# Patient Record
Sex: FEMALE | Race: WHITE | NOT HISPANIC OR LATINO | Employment: FULL TIME | URBAN - METROPOLITAN AREA
[De-identification: names, ages, dates, MRNs, and addresses within clinical notes are randomized per-mention and may not be internally consistent; named-entity substitution may affect disease eponyms.]

---

## 2023-01-25 ENCOUNTER — OFFICE VISIT (OUTPATIENT)
Dept: URGENT CARE | Facility: CLINIC | Age: 18
End: 2023-01-25

## 2023-01-25 VITALS
TEMPERATURE: 98.9 F | WEIGHT: 95.6 LBS | BODY MASS INDEX: 18.05 KG/M2 | DIASTOLIC BLOOD PRESSURE: 68 MMHG | HEART RATE: 76 BPM | OXYGEN SATURATION: 100 % | HEIGHT: 61 IN | SYSTOLIC BLOOD PRESSURE: 104 MMHG | RESPIRATION RATE: 18 BRPM

## 2023-01-25 DIAGNOSIS — N60.12 FIBROCYSTIC CHANGES OF LEFT BREAST: Primary | ICD-10-CM

## 2023-01-25 NOTE — PATIENT INSTRUCTIONS
Clinical presentation appears to be consistent with fibrocystic breast of the left breast  There are no signs of any other abnormalities present on exam at this time  It was discussed with the patient that this kind of breast mass/lump can fluctuate with the menstrual cycle and can be treated with applying a heating pad over the site, and taking Tylenol or Motrin as needed for discomfort  Patient has been instructed to follow up with her PCP office for re-check in 1 week  If symptoms acutely worsen or any new symptoms present, patient is to be seen in the ER

## 2023-01-25 NOTE — PROGRESS NOTES
[facility]        NAME: Darrel Sarabia is a 21 y o  female  : 2005    MRN: [id number]  DATE: 2023  TIME: 3:00 PM    Assessment and Plan   Fibrocystic changes of left breast [N60 12]  1  Fibrocystic changes of left breast              Patient Instructions     Patient Instructions   Clinical presentation appears to be consistent with fibrocystic breast of the left breast  There are no signs of any other abnormalities present on exam at this time  It was discussed with the patient that this kind of breast mass/lump can fluctuate with the menstrual cycle and can be treated with applying a heating pad over the site, and taking Tylenol or Motrin as needed for discomfort  Patient has been instructed to follow up with her PCP office for re-check in 1 week  If symptoms acutely worsen or any new symptoms present, patient is to be seen in the ER  Follow up with PCP in 3-5 days  Proceed to  ER if symptoms worsen  Chief Complaint     Chief Complaint   Patient presents with   • Breast Mass     Pt here for concerns of lump in left breast x 2 months  History of Present Illness       22 yo female presents for a L breast lump for the past 2 months  She denies any injury to the site  No bruising or redness of the overlying skin  She states the mass does seem to fluctuate in size and feels mobile  She does note the area feels more swollen and tender around the time of her menstrual cycle  No fever/chills  No nipple changes or discharge  No swollen/painful lymph nodes in the axillary region  No right breast symptoms  No chest pain or SOB  No abdominal pain or GI symptoms  No skin rashes  She has not attempted any treatment for the symptoms  She has not been seen for this prior to today's visit  She feels well otherwise with no other complaints  She is otherwise healthy with no known medical problems  Review of Systems   Review of Systems   Constitutional: Negative      Respiratory: Negative  Cardiovascular: Negative  Gastrointestinal: Negative  Genitourinary: Negative  Musculoskeletal: Negative  Skin:        As noted in HPI   Allergic/Immunologic:        As noted in chart   Neurological: Negative  Current Medications     No current outpatient medications on file  Current Allergies     Allergies as of 01/25/2023 - Reviewed 01/25/2023   Allergen Reaction Noted   • Keflex [cephalexin] GI Intolerance 01/25/2023            The following portions of the patient's history were reviewed and updated as appropriate: allergies, current medications, past family history, past medical history, past social history, past surgical history and problem list      Past Medical History:   Diagnosis Date   • Patient denies medical problems        Past Surgical History:   Procedure Laterality Date   • NO PAST SURGERIES         Family History   Problem Relation Age of Onset   • No Known Problems Mother    • No Known Problems Father          Medications have been verified  Objective   /68   Pulse 76   Temp 98 9 °F (37 2 °C) (Tympanic)   Resp 18   Ht 5' 1" (1 549 m)   Wt 43 4 kg (95 lb 9 6 oz)   SpO2 100%   BMI 18 06 kg/m²   No LMP recorded  Physical Exam     Physical Exam  Vitals and nursing note reviewed  Constitutional:       General: She is awake  She is not in acute distress  Appearance: Normal appearance  She is well-developed and well-groomed  She is not ill-appearing, toxic-appearing or diaphoretic  Cardiovascular:      Rate and Rhythm: Normal rate and regular rhythm  Pulses: Normal pulses  Heart sounds: Normal heart sounds  Pulmonary:      Effort: Pulmonary effort is normal  No tachypnea, accessory muscle usage or respiratory distress  Breath sounds: Normal breath sounds and air entry  Chest:   Breasts:     Right: Normal       Left: Mass and tenderness present  No bleeding, inverted nipple, nipple discharge or skin change  Comments: There is a localized area of lumpiness and mild tenderness in the medial lower quadrant of the left breast  No erythema or bruising of the overlying skin  Skin is appropriately warm and intact  No fluctuant area consistent with an abscess  No nipple discharge  No lymphadenopathy noted  Lymphadenopathy:      Upper Body:      Right upper body: No supraclavicular, axillary or pectoral adenopathy  Left upper body: No supraclavicular, axillary or pectoral adenopathy  Skin:     General: Skin is warm and dry  Capillary Refill: Capillary refill takes less than 2 seconds  Coloration: Skin is not pale  Findings: No abrasion, abscess, bruising, ecchymosis, erythema, rash or wound  Neurological:      Mental Status: She is alert and oriented to person, place, and time  Mental status is at baseline  Psychiatric:         Mood and Affect: Mood normal          Behavior: Behavior normal  Behavior is cooperative  Thought Content:  Thought content normal          Judgment: Judgment normal

## 2023-01-28 ENCOUNTER — HOSPITAL ENCOUNTER (EMERGENCY)
Facility: HOSPITAL | Age: 18
Discharge: HOME | End: 2023-01-28
Attending: EMERGENCY MEDICINE

## 2023-01-28 VITALS
HEART RATE: 68 BPM | RESPIRATION RATE: 20 BRPM | OXYGEN SATURATION: 98 % | DIASTOLIC BLOOD PRESSURE: 62 MMHG | SYSTOLIC BLOOD PRESSURE: 121 MMHG | TEMPERATURE: 98.2 F

## 2023-01-28 DIAGNOSIS — F41.9 ANXIETY: Primary | ICD-10-CM

## 2023-01-28 NOTE — DISCHARGE INSTRUCTIONS
Follow up with resources provided to you today by behavioral health counselor    Return to ED for new or worsening symptoms

## 2023-01-28 NOTE — ED PROVIDER NOTES
History  Chief Complaint   Patient presents with   • Panic Attack     Pt presents to ED states she thinks shes having a panic attack  Had recent testing done at [facility] for a lump in breast, blood work and testing was normal but patient is now anxious  Was prescribed xanax by PCP, took one dose last night and slept for 12 hours but when she woke up she states her heart was racing, nausea, gagging when she tries to eat      Patient is a 20 yo wf who reports 3 day history of dry heave vomiting whenever she goes to eat  States she has had lump to L breast for past couple months  Seen by urgent care recently and told she has fibrocystic changes  Seen at [facility] twice in last couple days  Had US breast, blood work and told everything looked fine  Spoke to PCP Dr Glenda Cooper over the phone and rx'd xanax  Woke middle of this past night feeling anxious and "gagging", then felt her heart racing  Denies SI/HI  Smokes cigarettes  Smokes marijuana  None       Past Medical History:   Diagnosis Date   • Patient denies medical problems        Past Surgical History:   Procedure Laterality Date   • NO PAST SURGERIES         Family History   Problem Relation Age of Onset   • No Known Problems Mother    • No Known Problems Father      I have reviewed and agree with the history as documented  E-Cigarette/Vaping   • E-Cigarette Use Never User      E-Cigarette/Vaping Substances     Social History     Tobacco Use   • Smoking status: Never   • Smokeless tobacco: Never   Vaping Use   • Vaping Use: Never used   Substance Use Topics   • Alcohol use: Never   • Drug use: Yes     Types: Marijuana       Review of Systems   Constitutional: Negative for chills and fever  HENT: Negative for ear pain and sore throat  Eyes: Negative for pain  Respiratory: Negative for cough and shortness of breath  Cardiovascular: Negative for chest pain and palpitations     Gastrointestinal: Negative for abdominal pain, diarrhea, nausea and vomiting  Genitourinary: Negative for dysuria and hematuria  Musculoskeletal: Negative for arthralgias and back pain  Skin: Negative for color change and rash  Neurological: Negative for syncope  Psychiatric/Behavioral: Negative for suicidal ideas  The patient is nervous/anxious  All other systems reviewed and are negative  Physical Exam  Physical Exam  Vitals and nursing note reviewed  Constitutional:       General: She is not in acute distress  Appearance: Normal appearance  She is not ill-appearing, toxic-appearing or diaphoretic  Comments: Anxious affect   HENT:      Head: Normocephalic and atraumatic  Right Ear: Tympanic membrane, ear canal and external ear normal       Left Ear: Tympanic membrane, ear canal and external ear normal       Nose: Nose normal       Mouth/Throat:      Mouth: Mucous membranes are moist       Pharynx: Oropharynx is clear  Eyes:      Extraocular Movements: Extraocular movements intact  Conjunctiva/sclera: Conjunctivae normal       Pupils: Pupils are equal, round, and reactive to light  Cardiovascular:      Rate and Rhythm: Normal rate and regular rhythm  Pulses: Normal pulses  Heart sounds: Normal heart sounds  Pulmonary:      Effort: Pulmonary effort is normal       Breath sounds: Normal breath sounds  Abdominal:      General: Abdomen is flat  Bowel sounds are normal       Palpations: Abdomen is soft  Tenderness: There is no abdominal tenderness  There is no right CVA tenderness, left CVA tenderness, guarding or rebound  Hernia: No hernia is present  Musculoskeletal:         General: Normal range of motion  Cervical back: Normal range of motion and neck supple  Skin:     General: Skin is warm and dry  Capillary Refill: Capillary refill takes less than 2 seconds  Neurological:      General: No focal deficit present  Mental Status: She is alert and oriented to person, place, and time  Psychiatric:      Comments: Anxious affect  conversive          Vital Signs  ED Triage Vitals [01/28/23 1045]   Temperature Pulse Respirations Blood Pressure SpO2   98 2 °F (36 8 °C) 68 20 121/62 98 %      Temp Source Heart Rate Source Patient Position - Orthostatic VS BP Location FiO2 (%)   Oral Monitor Lying Right arm --      Pain Score       --           Vitals:    01/28/23 1045   BP: 121/62   Pulse: 68   Patient Position - Orthostatic VS: Lying         Visual Acuity      ED Medications  Medications - No data to display    Diagnostic Studies  Results Reviewed     None                 No orders to display              Procedures  Procedures         ED Course                                             Medical Decision Making  Patient spoke with Johnathan Molina ED behavioral health counselor over the phone  Counselor faxed and patient was given  multiple sheets of follow-up resources for therapy  Denied SI/HI  Pt is not having nausea, therefore no antiemetic indicated  Suspect her dry heave vomiting is manifestation of her anxiety  Advised she may use xanax in short term  She was educated on risk of taking long-term benzodiazepine as it is habit-forming  She was strongly encouraged to follow-up with therapy      Anxiety: acute illness or injury      Disposition  Final diagnoses:   Anxiety     Time reflects when diagnosis was documented in both MDM as applicable and the Disposition within this note     Time User Action Codes Description Comment    1/28/2023 11:43 AM Bi Layne Add [F41 9] Anxiety       ED Disposition     ED Disposition   Discharge    Condition   Stable    Date/Time   Sat Jan 28, 2023 11:43 AM    Rose Oleary discharge to home/self care  Follow-up Information     Follow up With Specialties Details Why Contact Info    Chelsie Rosario MD    [address] [facility]  [phone]            There are no discharge medications for this patient        No discharge procedures on file      PDMP Review     None          ED Provider  Electronically Signed by           Hakeem Varela PA-C  01/28/23 6064

## 2023-01-28 NOTE — ED NOTES
Crisis spoke with patient to provide resources to patient for outpatient treatment and provide crisis counseling  We reviewed coping skills and ways to calm her anxiety  Patient was very receptive to this  She reported no suicidal/homicidal ideations, hallucinations or delusions  She did say that her anxiety was effecting her appetite and we discussed several ways to get ready for bed and prep her body to get restful sleep  We also discussed mindfulness time and meditation prior to eating in order to be in a better state to tolerate food and not feel ill after eating   She will follow up with previous therapist from a few years ago on Monday to see if they are able to see her again as she really liked her in the past and felt safe talking to her

## 2023-05-07 ENCOUNTER — HOSPITAL ENCOUNTER (EMERGENCY)
Facility: HOSPITAL | Age: 18
Discharge: HOME | End: 2023-05-08
Attending: EMERGENCY MEDICINE

## 2023-05-07 DIAGNOSIS — R11.2 NAUSEA AND VOMITING, UNSPECIFIED VOMITING TYPE: ICD-10-CM

## 2023-05-07 DIAGNOSIS — N30.90 CYSTITIS: ICD-10-CM

## 2023-05-07 DIAGNOSIS — R10.9 ABDOMINAL PAIN: ICD-10-CM

## 2023-05-07 DIAGNOSIS — J02.9 SORE THROAT: Primary | ICD-10-CM

## 2023-05-07 LAB
ALBUMIN SERPL BCP-MCNC: 4.7 G/DL (ref 3.5–5)
ALP SERPL-CCNC: 65 U/L (ref 34–104)
ALT SERPL W P-5'-P-CCNC: 8 U/L (ref 7–52)
ANION GAP SERPL CALCULATED.3IONS-SCNC: 11 MMOL/L (ref 4–13)
AST SERPL W P-5'-P-CCNC: 13 U/L (ref 13–39)
BASOPHILS # BLD AUTO: 0.04 THOUSANDS/ÂΜL (ref 0–0.1)
BASOPHILS NFR BLD AUTO: 0 % (ref 0–1)
BILIRUB SERPL-MCNC: 0.63 MG/DL (ref 0.2–1)
BILIRUB UR QL STRIP: NEGATIVE
BUN SERPL-MCNC: 12 MG/DL (ref 5–25)
CALCIUM SERPL-MCNC: 9.8 MG/DL (ref 8.4–10.2)
CHLORIDE SERPL-SCNC: 101 MMOL/L (ref 96–108)
CLARITY UR: ABNORMAL
CO2 SERPL-SCNC: 24 MMOL/L (ref 21–32)
COLOR UR: ABNORMAL
CREAT SERPL-MCNC: 0.63 MG/DL (ref 0.6–1.3)
EOSINOPHIL # BLD AUTO: 0.03 THOUSAND/ÂΜL (ref 0–0.61)
EOSINOPHIL NFR BLD AUTO: 0 % (ref 0–6)
ERYTHROCYTE [DISTWIDTH] IN BLOOD BY AUTOMATED COUNT: 11.9 % (ref 11.6–15.1)
EXT PREGNANCY TEST URINE: NEGATIVE
EXT. CONTROL: NORMAL
GFR SERPL CREATININE-BSD FRML MDRD: 130 ML/MIN/1.73SQ M
GLUCOSE SERPL-MCNC: 86 MG/DL (ref 65–140)
GLUCOSE UR STRIP-MCNC: NEGATIVE MG/DL
HCT VFR BLD AUTO: 40 % (ref 34.8–46.1)
HGB BLD-MCNC: 13.8 G/DL (ref 11.5–15.4)
HGB UR QL STRIP.AUTO: NEGATIVE
IMM GRANULOCYTES # BLD AUTO: 0.04 THOUSAND/UL (ref 0–0.2)
IMM GRANULOCYTES NFR BLD AUTO: 0 % (ref 0–2)
KETONES UR STRIP-MCNC: ABNORMAL MG/DL
LEUKOCYTE ESTERASE UR QL STRIP: NEGATIVE
LYMPHOCYTES # BLD AUTO: 0.79 THOUSANDS/ÂΜL (ref 0.6–4.47)
LYMPHOCYTES NFR BLD AUTO: 6 % (ref 14–44)
MCH RBC QN AUTO: 31.9 PG (ref 26.8–34.3)
MCHC RBC AUTO-ENTMCNC: 34.5 G/DL (ref 31.4–37.4)
MCV RBC AUTO: 92 FL (ref 82–98)
MONOCYTES # BLD AUTO: 0.66 THOUSAND/ÂΜL (ref 0.17–1.22)
MONOCYTES NFR BLD AUTO: 5 % (ref 4–12)
NEUTROPHILS # BLD AUTO: 11.19 THOUSANDS/ÂΜL (ref 1.85–7.62)
NEUTS SEG NFR BLD AUTO: 89 % (ref 43–75)
NITRITE UR QL STRIP: NEGATIVE
NRBC BLD AUTO-RTO: 0 /100 WBCS
PH UR STRIP.AUTO: 6.5 [PH]
PLATELET # BLD AUTO: 279 THOUSANDS/UL (ref 149–390)
PMV BLD AUTO: 9.8 FL (ref 8.9–12.7)
POTASSIUM SERPL-SCNC: 4 MMOL/L (ref 3.5–5.3)
PROT SERPL-MCNC: 7.7 G/DL (ref 6.4–8.4)
PROT UR STRIP-MCNC: NEGATIVE MG/DL
RBC # BLD AUTO: 4.33 MILLION/UL (ref 3.81–5.12)
S PYO DNA THROAT QL NAA+PROBE: NOT DETECTED
SODIUM SERPL-SCNC: 136 MMOL/L (ref 135–147)
SP GR UR STRIP.AUTO: 1.02 (ref 1–1.03)
UROBILINOGEN UR QL STRIP.AUTO: 0.2 E.U./DL
WBC # BLD AUTO: 12.75 THOUSAND/UL (ref 4.31–10.16)

## 2023-05-07 RX ORDER — ONDANSETRON 2 MG/ML
4 INJECTION INTRAMUSCULAR; INTRAVENOUS ONCE
Status: COMPLETED | OUTPATIENT
Start: 2023-05-07 | End: 2023-05-07

## 2023-05-07 RX ORDER — MORPHINE SULFATE 4 MG/ML
4 INJECTION, SOLUTION INTRAMUSCULAR; INTRAVENOUS ONCE
Status: COMPLETED | OUTPATIENT
Start: 2023-05-07 | End: 2023-05-07

## 2023-05-07 RX ADMIN — ONDANSETRON 4 MG: 2 INJECTION INTRAMUSCULAR; INTRAVENOUS at 23:22

## 2023-05-07 RX ADMIN — MORPHINE SULFATE 4 MG: 4 INJECTION INTRAVENOUS at 23:21

## 2023-05-08 ENCOUNTER — APPOINTMENT (EMERGENCY)
Dept: RADIOLOGY | Facility: HOSPITAL | Age: 18
End: 2023-05-08

## 2023-05-08 VITALS
DIASTOLIC BLOOD PRESSURE: 55 MMHG | RESPIRATION RATE: 16 BRPM | SYSTOLIC BLOOD PRESSURE: 110 MMHG | HEART RATE: 81 BPM | OXYGEN SATURATION: 96 % | BODY MASS INDEX: 17.04 KG/M2 | WEIGHT: 90.2 LBS | TEMPERATURE: 98.3 F

## 2023-05-08 RX ORDER — NITROFURANTOIN 25; 75 MG/1; MG/1
100 CAPSULE ORAL 2 TIMES DAILY
Qty: 10 CAPSULE | Refills: 0 | Status: SHIPPED | OUTPATIENT
Start: 2023-05-08

## 2023-05-08 RX ADMIN — IOHEXOL 90 ML: 350 INJECTION, SOLUTION INTRAVENOUS at 01:38

## 2023-05-08 NOTE — ED PROVIDER NOTES
History  Chief Complaint   Patient presents with   • Sore Throat     States sore throat for a week, no fever, yesterday loss her voice    • Vomiting Blood     States she was vomiting pink phlegm today and the last time she vomited there was blood  Vomited x 6 today  States pain upper abdomen      15-year-old female presents to the emergency department with a chief concern of sore throat, abdominal pain, nausea vomiting, congestion  Patient states that symptoms began about a week ago and have persisted  He describes the abdominal pain as a sharp intermittent pain that comes and goes without any aggravating or alleviating factors  History provided by:  Patient   used: No          None       Past Medical History:   Diagnosis Date   • Clostridium difficile infection     x 2 in 2022   • Patient denies medical problems        Past Surgical History:   Procedure Laterality Date   • NO PAST SURGERIES         Family History   Problem Relation Age of Onset   • No Known Problems Mother    • No Known Problems Father      I have reviewed and agree with the history as documented  E-Cigarette/Vaping   • E-Cigarette Use Never User      E-Cigarette/Vaping Substances     Social History     Tobacco Use   • Smoking status: Never   • Smokeless tobacco: Never   Vaping Use   • Vaping Use: Never used   Substance Use Topics   • Alcohol use: Never   • Drug use: Yes     Types: Marijuana     Comment: nightly       Review of Systems   Constitutional: Positive for fever  HENT: Positive for sore throat  Eyes: Negative for photophobia  Respiratory: Negative for shortness of breath  Cardiovascular: Negative for chest pain  Gastrointestinal: Positive for abdominal pain, nausea and vomiting  Genitourinary: Positive for dysuria  Negative for dyspareunia, flank pain, vaginal bleeding, vaginal discharge and vaginal pain  Musculoskeletal: Negative for back pain  Skin: Negative for color change  Neurological: Positive for light-headedness  All other systems reviewed and are negative  Physical Exam  Physical Exam  Vitals and nursing note reviewed  Constitutional:       General: She is not in acute distress  Appearance: She is well-developed  She is ill-appearing  HENT:      Head: Normocephalic and atraumatic  Eyes:      Conjunctiva/sclera: Conjunctivae normal    Cardiovascular:      Rate and Rhythm: Normal rate and regular rhythm  Heart sounds: No murmur heard  Pulmonary:      Effort: Pulmonary effort is normal  No respiratory distress  Breath sounds: Normal breath sounds  Abdominal:      General: Bowel sounds are normal  There are no signs of injury  Palpations: Abdomen is soft  There is no mass  Tenderness: There is abdominal tenderness in the right lower quadrant and suprapubic area  There is no guarding or rebound  Hernia: No hernia is present  Musculoskeletal:         General: No swelling  Cervical back: Neck supple  Skin:     General: Skin is warm and dry  Capillary Refill: Capillary refill takes less than 2 seconds  Neurological:      Mental Status: She is alert     Psychiatric:         Mood and Affect: Mood normal          Vital Signs  ED Triage Vitals [05/07/23 2139]   Temperature Pulse Respirations Blood Pressure SpO2   99 9 °F (37 7 °C) (!) 108 20 137/60 96 %      Temp Source Heart Rate Source Patient Position - Orthostatic VS BP Location FiO2 (%)   Tympanic Monitor Sitting Left arm --      Pain Score       5           Vitals:    05/07/23 2139 05/08/23 0314   BP: 137/60 110/55   Pulse: (!) 108 81   Patient Position - Orthostatic VS: Sitting          Visual Acuity      ED Medications  Medications   morphine injection 4 mg (4 mg Intravenous Given 5/7/23 2321)   ondansetron (ZOFRAN) injection 4 mg (4 mg Intravenous Given 5/7/23 2322)   iohexol (OMNIPAQUE) 350 MG/ML injection (SINGLE-DOSE) 100 mL (90 mL Intravenous Given 5/8/23 0138) Diagnostic Studies  Results Reviewed     Procedure Component Value Units Date/Time    Strep A PCR [[id number]]  (Normal) Collected: 05/07/23 2217    Lab Status: Final result Specimen: Throat Updated: 05/07/23 2251     STREP A PCR Not Detected    Comprehensive metabolic panel [[id number]] Collected: 05/07/23 2217    Lab Status: Final result Specimen: Blood from Arm, Right Updated: 05/07/23 2239     Sodium 136 mmol/L      Potassium 4 0 mmol/L      Chloride 101 mmol/L      CO2 24 mmol/L      ANION GAP 11 mmol/L      BUN 12 mg/dL      Creatinine 0 63 mg/dL      Glucose 86 mg/dL      Calcium 9 8 mg/dL      AST 13 U/L      ALT 8 U/L      Alkaline Phosphatase 65 U/L      Total Protein 7 7 g/dL      Albumin 4 7 g/dL      Total Bilirubin 0 63 mg/dL      eGFR 130 ml/min/1 73sq m     Narrative:      [place] guidelines for Chronic Kidney Disease (CKD):   •  Stage 1 with normal or high GFR (GFR > 90 mL/min/1 73 square meters)  •  Stage 2 Mild CKD (GFR = 60-89 mL/min/1 73 square meters)  •  Stage 3A Moderate CKD (GFR = 45-59 mL/min/1 73 square meters)  •  Stage 3B Moderate CKD (GFR = 30-44 mL/min/1 73 square meters)  •  Stage 4 Severe CKD (GFR = 15-29 mL/min/1 73 square meters)  •  Stage 5 End Stage CKD (GFR <15 mL/min/1 73 square meters)  Note: GFR calculation is accurate only with a steady state creatinine    UA w Reflex to Microscopic w Reflex to Culture [[id number]]  (Abnormal) Collected: 05/07/23 2219    Lab Status: Final result Specimen: Urine, Clean Catch Updated: 05/07/23 2225     Color, UA Light Yellow     Clarity, UA Slightly Cloudy     Specific Industry, UA 1 020     pH, UA 6 5     Leukocytes, UA Negative     Nitrite, UA Negative     Protein, UA Negative mg/dl      Glucose, UA Negative mg/dl      Ketones, UA >=80 (3+) mg/dl      Urobilinogen, UA 0 2 E U /dl      Bilirubin, UA Negative     Occult Blood, UA Negative    CBC and differential [[id number]]  (Abnormal) Collected: 05/07/23 2217 "Lab Status: Final result Specimen: Blood from Arm, Right Updated: 05/07/23 2224     WBC 12 75 Thousand/uL      RBC 4 33 Million/uL      Hemoglobin 13 8 g/dL      Hematocrit 40 0 %      MCV 92 fL      MCH 31 9 pg      MCHC 34 5 g/dL      RDW 11 9 %      MPV 9 8 fL      Platelets 945 Thousands/uL      nRBC 0 /100 WBCs      Neutrophils Relative 89 %      Immat GRANS % 0 %      Lymphocytes Relative 6 %      Monocytes Relative 5 %      Eosinophils Relative 0 %      Basophils Relative 0 %      Neutrophils Absolute 11 19 Thousands/µL      Immature Grans Absolute 0 04 Thousand/uL      Lymphocytes Absolute 0 79 Thousands/µL      Monocytes Absolute 0 66 Thousand/µL      Eosinophils Absolute 0 03 Thousand/µL      Basophils Absolute 0 04 Thousands/µL     POCT pregnancy, urine [[id number]]  (Normal) Resulted: 05/07/23 2221    Lab Status: Final result Updated: 05/07/23 2221     EXT Preg Test, Ur Negative     Control Valid                 CT abdomen pelvis with contrast   Final Result by Aubree Wiggins MD (05/08 4030)      1  Tubular structure in the right lower pelvis/adnexa, unclear whether this represents fluid within normal small bowel, or a hydrosalpinx, if there is clinical concern for gynecologic pathology, suggest pelvic ultrasound   2  Mild bladder wall thickening, correlate for cystitis         I personally discussed this study with René at 5/8/23 3:00 AM                 Procedures  Procedures         ED Course         CRAFFT    Flowsheet Row Most Recent Value   NOAH Initial Screen: During the past 12 months, did you:    1  Drink any alcohol (more than a few sips)? No Filed at: 05/07/2023 2143   2  Smoke any marijuana or hashish Yes Filed at: 05/07/2023 2143   3  Use anything else to get high? (\"anything else\" includes illegal drugs, over the counter and prescription drugs, and things that you sniff or 'benoit')?  No Filed at: 05/07/2023 2143                                          Medical Decision " Making  61-year-old healthy female presents with signs and symptoms of sore throat, congestion, nausea vomiting  She is well-appearing and showing no signs of systemic distress  She is afebrile and satting 96% on room air  Physical exam is benign  She has no abdominal tenderness and is handling oral secretions and p o  intake at the bedside  Labs and imaging reviewed  While in the emergency department patient had no further instances of vomiting  It is deemed that she likely has some type of viral syndrome and is given instructions for follow-up and supportive care measures  She is also given strict return precautions  Amount and/or Complexity of Data Reviewed  Labs: ordered  Radiology: ordered  Risk  Prescription drug management  Disposition  Final diagnoses:   Sore throat   Abdominal pain   Nausea and vomiting, unspecified vomiting type   Cystitis     Time reflects when diagnosis was documented in both MDM as applicable and the Disposition within this note     Time User Action Codes Description Comment    5/8/2023  3:12 AM Demian Nim Add [J02 9] Sore throat     5/8/2023  3:12 AM Demian Nim Add [R10 9] Abdominal pain     5/8/2023  3:12 AM Demian Nim Add [R11 2] Nausea and vomiting, unspecified vomiting type     5/8/2023  3:12 AM Demian Nim Add [N30 90] Cystitis       ED Disposition     ED Disposition   Discharge    Condition   Stable    Date/Time   Mon May 8, 2023  3:13 AM    Comment   Antonette Officer discharge to home/self care                 Follow-up Information     Follow up With Specialties Details Why Contact Info    Your primary doctor  Call in 1 day      Your gynecologist  Call in 1 day            Discharge Medication List as of 5/8/2023  3:16 AM      START taking these medications    Details   nitrofurantoin (MACROBID) 100 mg capsule Take 1 capsule (100 mg total) by mouth 2 (two) times a day, Starting Mon 5/8/2023, Normal             No discharge procedures on file      PDMP Review     None          ED Provider  Electronically Signed by           Perez Simmons MD  05/12/23 5356

## 2023-05-08 NOTE — DISCHARGE INSTRUCTIONS
Take medication as directed  We will need an outpatient ultrasound of your pelvis based on the radiologist recommendations  Follow-up with your primary doctor and/or gynecologist as discussed  Turn to the emergency department at anytime for any new or worsening issues

## 2023-05-10 ENCOUNTER — HOSPITAL ENCOUNTER (EMERGENCY)
Facility: HOSPITAL | Age: 18
Discharge: HOME | End: 2023-05-10
Attending: EMERGENCY MEDICINE

## 2023-05-10 VITALS
OXYGEN SATURATION: 100 % | HEART RATE: 73 BPM | TEMPERATURE: 97.9 F | DIASTOLIC BLOOD PRESSURE: 65 MMHG | RESPIRATION RATE: 16 BRPM | SYSTOLIC BLOOD PRESSURE: 110 MMHG

## 2023-05-10 DIAGNOSIS — R11.2 NAUSEA & VOMITING: Primary | ICD-10-CM

## 2023-05-10 LAB
ANION GAP SERPL CALCULATED.3IONS-SCNC: 8 MMOL/L (ref 4–13)
BACTERIA UR QL AUTO: ABNORMAL /HPF
BASOPHILS # BLD AUTO: 0.02 THOUSANDS/ÂΜL (ref 0–0.1)
BASOPHILS NFR BLD AUTO: 0 % (ref 0–1)
BILIRUB UR QL STRIP: NEGATIVE
BUN SERPL-MCNC: 12 MG/DL (ref 5–25)
CALCIUM SERPL-MCNC: 9.2 MG/DL (ref 8.4–10.2)
CHLORIDE SERPL-SCNC: 102 MMOL/L (ref 96–108)
CLARITY UR: ABNORMAL
CO2 SERPL-SCNC: 27 MMOL/L (ref 21–32)
COLOR UR: YELLOW
CREAT SERPL-MCNC: 0.55 MG/DL (ref 0.6–1.3)
EOSINOPHIL # BLD AUTO: 0.07 THOUSAND/ÂΜL (ref 0–0.61)
EOSINOPHIL NFR BLD AUTO: 2 % (ref 0–6)
ERYTHROCYTE [DISTWIDTH] IN BLOOD BY AUTOMATED COUNT: 11.9 % (ref 11.6–15.1)
GFR SERPL CREATININE-BSD FRML MDRD: 136 ML/MIN/1.73SQ M
GLUCOSE SERPL-MCNC: 87 MG/DL (ref 65–140)
GLUCOSE UR STRIP-MCNC: NEGATIVE MG/DL
HCT VFR BLD AUTO: 39.7 % (ref 34.8–46.1)
HGB BLD-MCNC: 13.2 G/DL (ref 11.5–15.4)
HGB UR QL STRIP.AUTO: NEGATIVE
IMM GRANULOCYTES # BLD AUTO: 0.02 THOUSAND/UL (ref 0–0.2)
IMM GRANULOCYTES NFR BLD AUTO: 0 % (ref 0–2)
KETONES UR STRIP-MCNC: ABNORMAL MG/DL
LEUKOCYTE ESTERASE UR QL STRIP: NEGATIVE
LYMPHOCYTES # BLD AUTO: 1.74 THOUSANDS/ÂΜL (ref 0.6–4.47)
LYMPHOCYTES NFR BLD AUTO: 37 % (ref 14–44)
MCH RBC QN AUTO: 30.8 PG (ref 26.8–34.3)
MCHC RBC AUTO-ENTMCNC: 33.2 G/DL (ref 31.4–37.4)
MCV RBC AUTO: 93 FL (ref 82–98)
MONOCYTES # BLD AUTO: 0.8 THOUSAND/ÂΜL (ref 0.17–1.22)
MONOCYTES NFR BLD AUTO: 17 % (ref 4–12)
MUCOUS THREADS UR QL AUTO: ABNORMAL
NEUTROPHILS # BLD AUTO: 2.12 THOUSANDS/ÂΜL (ref 1.85–7.62)
NEUTS SEG NFR BLD AUTO: 44 % (ref 43–75)
NITRITE UR QL STRIP: NEGATIVE
NON-SQ EPI CELLS URNS QL MICRO: ABNORMAL /HPF
NRBC BLD AUTO-RTO: 0 /100 WBCS
PH UR STRIP.AUTO: 6.5 [PH]
PLATELET # BLD AUTO: 286 THOUSANDS/UL (ref 149–390)
PMV BLD AUTO: 9.6 FL (ref 8.9–12.7)
POTASSIUM SERPL-SCNC: 3.6 MMOL/L (ref 3.5–5.3)
PROT UR STRIP-MCNC: ABNORMAL MG/DL
RBC # BLD AUTO: 4.29 MILLION/UL (ref 3.81–5.12)
RBC #/AREA URNS AUTO: ABNORMAL /HPF
SODIUM SERPL-SCNC: 137 MMOL/L (ref 135–147)
SP GR UR STRIP.AUTO: 1.03 (ref 1–1.03)
UROBILINOGEN UR STRIP-ACNC: 2 MG/DL
WBC # BLD AUTO: 4.77 THOUSAND/UL (ref 4.31–10.16)
WBC #/AREA URNS AUTO: ABNORMAL /HPF

## 2023-05-10 RX ORDER — ONDANSETRON 2 MG/ML
4 INJECTION INTRAMUSCULAR; INTRAVENOUS ONCE
Status: DISCONTINUED | OUTPATIENT
Start: 2023-05-10 | End: 2023-05-10

## 2023-05-10 RX ADMIN — SODIUM CHLORIDE 1000 ML: 0.9 INJECTION, SOLUTION INTRAVENOUS at 21:08

## 2023-05-11 NOTE — ED PROVIDER NOTES
History  Chief Complaint   Patient presents with   • Vomiting     Pt seen at [facility] a few days ago for vomiting  Given ABX for cystitis  Not feeling any relief  Still vomiting        Jeevan Gifford is a 21 y o  female who presents with the chief complaint of continued nausea and vomiting  She was seen initially at our [address] [place] [address] on May 7th (3 days ago) for similar symptoms  She had a ct scan that commented on possible cystitis and she was discharged on nitrofurantoin  She reports that her nausea is worsened with the nitrofurantoin  Of note her UA performed that days showed only ketones but no wbc or nitrates and there was no reflex culture  No prior abdominal surgeries  History provided by:  Patient and medical records   used: No    Vomiting  Severity:  Moderate  Duration:  4 days  Timing:  Intermittent  Progression:  Unchanged  Chronicity:  New  Recent urination:  Normal  Exacerbated by: nitrofurantoin  Associated symptoms: abdominal pain (mild)    Associated symptoms: no chills, no diarrhea and no fever    Risk factors: no prior abdominal surgery        Prior to Admission Medications   Prescriptions Last Dose Informant Patient Reported? Taking?   nitrofurantoin (MACROBID) 100 mg capsule   No No   Sig: Take 1 capsule (100 mg total) by mouth 2 (two) times a day      Facility-Administered Medications: None       Past Medical History:   Diagnosis Date   • Clostridium difficile infection     x 2 in 2022   • Patient denies medical problems        Past Surgical History:   Procedure Laterality Date   • NO PAST SURGERIES         Family History   Problem Relation Age of Onset   • No Known Problems Mother    • No Known Problems Father      I have reviewed and agree with the history as documented      E-Cigarette/Vaping   • E-Cigarette Use Never User      E-Cigarette/Vaping Substances     Social History     Tobacco Use   • Smoking status: Never   • Smokeless tobacco: Never   Vaping Use   • Vaping Use: Never used   Substance Use Topics   • Alcohol use: Never   • Drug use: Yes     Types: Marijuana     Comment: nightly       Review of Systems   Constitutional: Negative for chills, diaphoresis and fever  Respiratory: Negative for shortness of breath  Cardiovascular: Negative for chest pain and palpitations  Gastrointestinal: Positive for abdominal pain (mild) and vomiting  Negative for diarrhea and nausea  Genitourinary: Negative for dysuria and frequency  Skin: Negative for rash  All other systems reviewed and are negative  Physical Exam  Physical Exam  Vitals and nursing note reviewed  Constitutional:       General: She is not in acute distress  Appearance: She is well-developed  HENT:      Head: Normocephalic and atraumatic  Eyes:      Extraocular Movements: Extraocular movements intact  Pupils: Pupils are equal, round, and reactive to light  Neck:      Vascular: No JVD  Cardiovascular:      Rate and Rhythm: Normal rate and regular rhythm  Heart sounds: Normal heart sounds  No murmur heard  No friction rub  No gallop  Pulmonary:      Effort: Pulmonary effort is normal  No respiratory distress  Breath sounds: Normal breath sounds  No wheezing or rales  Chest:      Chest wall: No tenderness  Musculoskeletal:         General: No tenderness  Normal range of motion  Cervical back: Normal range of motion  Skin:     General: Skin is warm and dry  Neurological:      General: No focal deficit present  Mental Status: She is alert and oriented to person, place, and time  Psychiatric:         Behavior: Behavior normal          Thought Content:  Thought content normal          Judgment: Judgment normal          Vital Signs  ED Triage Vitals [05/10/23 1922]   Temperature Pulse Respirations Blood Pressure SpO2   97 9 °F (36 6 °C) 80 16 125/62 97 %      Temp Source Heart Rate Source Patient Position - Orthostatic VS BP Location FiO2 (%) Oral Monitor Sitting Right arm --      Pain Score       --           Vitals:    05/10/23 1922   BP: 125/62   Pulse: 80   Patient Position - Orthostatic VS: Sitting         Visual Acuity      ED Medications  Medications   sodium chloride 0 9 % bolus 1,000 mL (has no administration in time range)       Diagnostic Studies  Results Reviewed     Procedure Component Value Units Date/Time    CBC and differential [[id number]]     Lab Status: No result Specimen: Blood     Basic metabolic panel [[id number]]     Lab Status: No result Specimen: Blood     UA w Reflex to Microscopic w Reflex to Culture [[id number]]     Lab Status: No result Specimen: Urine                  No orders to display              Procedures  Procedures         ED Course                                             Medical Decision Making  Background: 21 y o  female presents with ***    Differential DX includes but is not limited to: ***    Plan: ***      Amount and/or Complexity of Data Reviewed  Labs: ordered  Disposition  Final diagnoses:   None     ED Disposition     None      Follow-up Information    None         Patient's Medications   Discharge Prescriptions    No medications on file       No discharge procedures on file      PDMP Review     None          ED Provider  Electronically Signed by meters)  •  Stage 2 Mild CKD (GFR = 60-89 mL/min/1 73 square meters)  •  Stage 3A Moderate CKD (GFR = 45-59 mL/min/1 73 square meters)  •  Stage 3B Moderate CKD (GFR = 30-44 mL/min/1 73 square meters)  •  Stage 4 Severe CKD (GFR = 15-29 mL/min/1 73 square meters)  •  Stage 5 End Stage CKD (GFR <15 mL/min/1 73 square meters)  Note: GFR calculation is accurate only with a steady state creatinine    CBC and differential [[id number]]  (Abnormal) Collected: 05/10/23 2108    Lab Status: Final result Specimen: Blood from Arm, Right Updated: 05/10/23 2116     WBC 4 77 Thousand/uL      RBC 4 29 Million/uL      Hemoglobin 13 2 g/dL      Hematocrit 39 7 %      MCV 93 fL      MCH 30 8 pg      MCHC 33 2 g/dL      RDW 11 9 %      MPV 9 6 fL      Platelets 028 Thousands/uL      nRBC 0 /100 WBCs      Neutrophils Relative 44 %      Immat GRANS % 0 %      Lymphocytes Relative 37 %      Monocytes Relative 17 %      Eosinophils Relative 2 %      Basophils Relative 0 %      Neutrophils Absolute 2 12 Thousands/µL      Immature Grans Absolute 0 02 Thousand/uL      Lymphocytes Absolute 1 74 Thousands/µL      Monocytes Absolute 0 80 Thousand/µL      Eosinophils Absolute 0 07 Thousand/µL      Basophils Absolute 0 02 Thousands/µL                  No orders to display              Procedures  Procedures         ED Course                                             Medical Decision Making  Background: 21 y o  female presents with nausea and vomiting    Differential DX includes but is not limited to: side effects of antibiotics, viral gastritis, gastritis    Plan: cbc, bmp, urinalysis       Amount and/or Complexity of Data Reviewed  Labs: ordered            Disposition  Final diagnoses:   Nausea & vomiting     Time reflects when diagnosis was documented in both MDM as applicable and the Disposition within this note     Time User Action Codes Description Comment    5/10/2023 11:05 PM Yudith VU Add [R11 2] Nausea & vomiting ED Disposition     ED Disposition   Discharge    Condition   Stable    Date/Time   Wed May 10, 2023 11:05 PM    Comment   Cyrus Quiros discharge to home/self care  Follow-up Information     Follow up With Specialties Details Why Contact Info    your primary care physician              Discharge Medication List as of 5/10/2023 11:07 PM      CONTINUE these medications which have NOT CHANGED    Details   nitrofurantoin (MACROBID) 100 mg capsule Take 1 capsule (100 mg total) by mouth 2 (two) times a day, Starting Mon 5/8/2023, Normal             No discharge procedures on file      PDMP Review     None          ED Provider  Electronically Signed by           Stephanie Lantigua MD  05/17/23 2024

## 2023-05-12 LAB — BACTERIA UR CULT: NORMAL

## 2023-05-12 NOTE — ED CARE HANDOFF
Emergency Department Sign Out Note        Sign out and transfer of care from Dr Estela Maurice  See Separate Emergency Department note  The patient, Tete Ca, was evaluated by the previous provider for nausea & vomiting while on nitrofurantoin  Workup Completed:  Pt  Assessed & labs initiated  Pt  Declined antiemetic  ED Course / Workup Pending (followup): Lab F/U & pt  Reassessment  Nitrofurantoin had been initiated w/ concern for possible UTI  Upon review of UA this was identified to be non diagnostic   UA & UCx ordered today  If UA not suggestive of UTI plan was to discontinue nitrofurantoin as this was felt to be a likely cause of her nausea  ED Course as of 05/11/23 2318   Wed May 10, 2023   2304 Blood work is unremarkable  Urine has returned with presence of some ketones  No bacteria are visualized  Moderate epithelial cells and small amount of red blood cells and white blood cells suggesting contamination  Urine culture has already been requested  Will discharge  Pt  Karolina Gonzalestle well at time of DC  Qs answered  Procedures  MDM        Disposition  Final diagnoses:   Nausea & vomiting     Time reflects when diagnosis was documented in both MDM as applicable and the Disposition within this note     Time User Action Codes Description Comment    5/10/2023 11:05 PM Min VU Add [R11 2] Nausea & vomiting       ED Disposition     ED Disposition   Discharge    Condition   Stable    Date/Time   Wed May 10, 2023 11:05 PM    Comment   Tete Ca discharge to home/self care                 Follow-up Information     Follow up With Specialties Details Why Contact Info    your primary care physician            Discharge Medication List as of 5/10/2023 11:07 PM      CONTINUE these medications which have NOT CHANGED    Details   nitrofurantoin (MACROBID) 100 mg capsule Take 1 capsule (100 mg total) by mouth 2 (two) times a day, Starting Mon 5/8/2023, Normal           No discharge procedures on file         ED Provider  Electronically Signed by     Rubi Reese MD  05/11/23 1123

## 2024-07-04 ENCOUNTER — HOSPITAL ENCOUNTER (EMERGENCY)
Facility: HOSPITAL | Age: 19
Discharge: HOME | End: 2024-07-04
Attending: STUDENT IN AN ORGANIZED HEALTH CARE EDUCATION/TRAINING PROGRAM
Payer: COMMERCIAL

## 2024-07-04 ENCOUNTER — APPOINTMENT (EMERGENCY)
Dept: RADIOLOGY | Facility: HOSPITAL | Age: 19
End: 2024-07-04
Payer: COMMERCIAL

## 2024-07-04 VITALS
RESPIRATION RATE: 16 BRPM | SYSTOLIC BLOOD PRESSURE: 125 MMHG | OXYGEN SATURATION: 97 % | HEART RATE: 81 BPM | DIASTOLIC BLOOD PRESSURE: 74 MMHG | TEMPERATURE: 98.2 F

## 2024-07-04 DIAGNOSIS — D25.9 UTERINE FIBROID: ICD-10-CM

## 2024-07-04 DIAGNOSIS — R10.12 LUQ ABDOMINAL PAIN: Primary | ICD-10-CM

## 2024-07-04 LAB
ALBUMIN SERPL BCG-MCNC: 4.6 G/DL (ref 3.5–5)
ALP SERPL-CCNC: 43 U/L (ref 34–104)
ALT SERPL W P-5'-P-CCNC: 7 U/L (ref 7–52)
ANION GAP SERPL CALCULATED.3IONS-SCNC: 6 MMOL/L (ref 4–13)
AST SERPL W P-5'-P-CCNC: 12 U/L (ref 13–39)
BASOPHILS # BLD AUTO: 0.04 THOUSANDS/ÂΜL (ref 0–0.1)
BASOPHILS NFR BLD AUTO: 1 % (ref 0–1)
BILIRUB SERPL-MCNC: 0.43 MG/DL (ref 0.2–1)
BILIRUB UR QL STRIP: NEGATIVE
BUN SERPL-MCNC: 15 MG/DL (ref 5–25)
CALCIUM SERPL-MCNC: 9.3 MG/DL (ref 8.4–10.2)
CHLORIDE SERPL-SCNC: 104 MMOL/L (ref 96–108)
CLARITY UR: CLEAR
CO2 SERPL-SCNC: 25 MMOL/L (ref 21–32)
COLOR UR: COLORLESS
CREAT SERPL-MCNC: 0.68 MG/DL (ref 0.6–1.3)
EOSINOPHIL # BLD AUTO: 0.08 THOUSAND/ÂΜL (ref 0–0.61)
EOSINOPHIL NFR BLD AUTO: 1 % (ref 0–6)
ERYTHROCYTE [DISTWIDTH] IN BLOOD BY AUTOMATED COUNT: 11.7 % (ref 11.6–15.1)
EXT PREGNANCY TEST URINE: NEGATIVE
EXT. CONTROL: NORMAL
GFR SERPL CREATININE-BSD FRML MDRD: 125 ML/MIN/1.73SQ M
GLUCOSE SERPL-MCNC: 92 MG/DL (ref 65–140)
GLUCOSE UR STRIP-MCNC: NEGATIVE MG/DL
HCT VFR BLD AUTO: 37.3 % (ref 34.8–46.1)
HGB BLD-MCNC: 12.6 G/DL (ref 11.5–15.4)
HGB UR QL STRIP.AUTO: NEGATIVE
IMM GRANULOCYTES # BLD AUTO: 0.01 THOUSAND/UL (ref 0–0.2)
IMM GRANULOCYTES NFR BLD AUTO: 0 % (ref 0–2)
KETONES UR STRIP-MCNC: NEGATIVE MG/DL
LEUKOCYTE ESTERASE UR QL STRIP: NEGATIVE
LIPASE SERPL-CCNC: 12 U/L (ref 11–82)
LYMPHOCYTES # BLD AUTO: 1.82 THOUSANDS/ÂΜL (ref 0.6–4.47)
LYMPHOCYTES NFR BLD AUTO: 28 % (ref 14–44)
MCH RBC QN AUTO: 31 PG (ref 26.8–34.3)
MCHC RBC AUTO-ENTMCNC: 33.8 G/DL (ref 31.4–37.4)
MCV RBC AUTO: 92 FL (ref 82–98)
MONOCYTES # BLD AUTO: 0.4 THOUSAND/ÂΜL (ref 0.17–1.22)
MONOCYTES NFR BLD AUTO: 6 % (ref 4–12)
NEUTROPHILS # BLD AUTO: 4.28 THOUSANDS/ÂΜL (ref 1.85–7.62)
NEUTS SEG NFR BLD AUTO: 64 % (ref 43–75)
NITRITE UR QL STRIP: NEGATIVE
NRBC BLD AUTO-RTO: 0 /100 WBCS
PH UR STRIP.AUTO: 7 [PH]
PLATELET # BLD AUTO: 286 THOUSANDS/UL (ref 149–390)
PMV BLD AUTO: 9.7 FL (ref 8.9–12.7)
POTASSIUM SERPL-SCNC: 3.7 MMOL/L (ref 3.5–5.3)
PROT SERPL-MCNC: 6.6 G/DL (ref 6.4–8.4)
PROT UR STRIP-MCNC: NEGATIVE MG/DL
RBC # BLD AUTO: 4.06 MILLION/UL (ref 3.81–5.12)
SODIUM SERPL-SCNC: 135 MMOL/L (ref 135–147)
SP GR UR STRIP.AUTO: 1.01 (ref 1–1.03)
UROBILINOGEN UR STRIP-ACNC: <2 MG/DL
WBC # BLD AUTO: 6.63 THOUSAND/UL (ref 4.31–10.16)

## 2024-07-04 PROCEDURE — 36415 COLL VENOUS BLD VENIPUNCTURE: CPT | Performed by: STUDENT IN AN ORGANIZED HEALTH CARE EDUCATION/TRAINING PROGRAM

## 2024-07-04 PROCEDURE — 85025 COMPLETE CBC W/AUTO DIFF WBC: CPT | Performed by: STUDENT IN AN ORGANIZED HEALTH CARE EDUCATION/TRAINING PROGRAM

## 2024-07-04 PROCEDURE — 81003 URINALYSIS AUTO W/O SCOPE: CPT | Performed by: STUDENT IN AN ORGANIZED HEALTH CARE EDUCATION/TRAINING PROGRAM

## 2024-07-04 PROCEDURE — 83690 ASSAY OF LIPASE: CPT | Performed by: STUDENT IN AN ORGANIZED HEALTH CARE EDUCATION/TRAINING PROGRAM

## 2024-07-04 PROCEDURE — 81025 URINE PREGNANCY TEST: CPT | Performed by: STUDENT IN AN ORGANIZED HEALTH CARE EDUCATION/TRAINING PROGRAM

## 2024-07-04 PROCEDURE — 74177 CT ABD & PELVIS W/CONTRAST: CPT

## 2024-07-04 PROCEDURE — 99285 EMERGENCY DEPT VISIT HI MDM: CPT | Performed by: STUDENT IN AN ORGANIZED HEALTH CARE EDUCATION/TRAINING PROGRAM

## 2024-07-04 PROCEDURE — 80053 COMPREHEN METABOLIC PANEL: CPT | Performed by: STUDENT IN AN ORGANIZED HEALTH CARE EDUCATION/TRAINING PROGRAM

## 2024-07-04 RX ORDER — KETOROLAC TROMETHAMINE 30 MG/ML
15 INJECTION, SOLUTION INTRAMUSCULAR; INTRAVENOUS ONCE
Status: COMPLETED | OUTPATIENT
Start: 2024-07-04 | End: 2024-07-04

## 2024-07-04 RX ORDER — FAMOTIDINE 20 MG/1
20 TABLET, FILM COATED ORAL 2 TIMES DAILY
Qty: 30 TABLET | Refills: 0 | Status: SHIPPED | OUTPATIENT
Start: 2024-07-04

## 2024-07-04 RX ADMIN — SODIUM CHLORIDE 1000 ML: 0.9 INJECTION, SOLUTION INTRAVENOUS at 00:51

## 2024-07-04 RX ADMIN — IOHEXOL 70 ML: 350 INJECTION, SOLUTION INTRAVENOUS at 01:52

## 2024-07-04 RX ADMIN — KETOROLAC TROMETHAMINE 15 MG: 30 INJECTION, SOLUTION INTRAMUSCULAR at 00:49

## 2024-07-04 NOTE — ED PROVIDER NOTES
History  Chief Complaint   Patient presents with    Abdominal Pain     Pt with LUQ pain for few hours while playing video games, reports was initially sharp and has been constant since.      Patient is a 19-year-old female, no pertinent past medical history, who presents the emergency room for left upper quadrant abdominal pain.  Started earlier today.  Was more severe previously and has somewhat subsided but she still feels pain.  No modifying factors.  No associated symptoms.  No pain like this in the past.  No fevers or chills.  No urinary symptoms.  No other complaints or concerns.      Abdominal Pain      Prior to Admission Medications   Prescriptions Last Dose Informant Patient Reported? Taking?   nitrofurantoin (MACROBID) 100 mg capsule   No No   Sig: Take 1 capsule (100 mg total) by mouth 2 (two) times a day      Facility-Administered Medications: None       Past Medical History:   Diagnosis Date    Clostridium difficile infection     x 2 in 2022    Patient denies medical problems        Past Surgical History:   Procedure Laterality Date    NO PAST SURGERIES         Family History   Problem Relation Age of Onset    No Known Problems Mother     No Known Problems Father      I have reviewed and agree with the history as documented.    E-Cigarette/Vaping    E-Cigarette Use Never User      E-Cigarette/Vaping Substances     Social History     Tobacco Use    Smoking status: Never    Smokeless tobacco: Never   Vaping Use    Vaping status: Never Used   Substance Use Topics    Alcohol use: Never    Drug use: Yes     Types: Marijuana     Comment: nightly       Review of Systems   Gastrointestinal:  Positive for abdominal pain.   All other systems reviewed and are negative.      Physical Exam  Physical Exam  Vitals and nursing note reviewed.   Constitutional:       General: She is not in acute distress.     Appearance: She is well-developed. She is not ill-appearing, toxic-appearing or diaphoretic.   HENT:      Head:  Normocephalic and atraumatic.      Right Ear: External ear normal.      Left Ear: External ear normal.      Nose: Nose normal.   Eyes:      General: Lids are normal. No scleral icterus.  Cardiovascular:      Rate and Rhythm: Normal rate and regular rhythm.      Heart sounds: Normal heart sounds. No murmur heard.     No friction rub. No gallop.   Pulmonary:      Effort: Pulmonary effort is normal. No respiratory distress.      Breath sounds: Normal breath sounds. No wheezing or rales.   Abdominal:      Palpations: Abdomen is soft.      Tenderness: There is abdominal tenderness in the left upper quadrant. There is no guarding or rebound.   Musculoskeletal:         General: No deformity. Normal range of motion.      Cervical back: Normal range of motion and neck supple.   Skin:     General: Skin is warm and dry.   Neurological:      General: No focal deficit present.      Mental Status: She is alert.   Psychiatric:         Mood and Affect: Mood normal.         Behavior: Behavior normal.         Vital Signs  ED Triage Vitals   Temperature Pulse Respirations Blood Pressure SpO2   07/04/24 0009 07/04/24 0009 07/04/24 0009 07/04/24 0009 07/04/24 0009   98.2 °F (36.8 °C) 81 16 125/74 97 %      Temp Source Heart Rate Source Patient Position - Orthostatic VS BP Location FiO2 (%)   07/04/24 0009 07/04/24 0009 07/04/24 0009 07/04/24 0009 --   Oral Monitor Sitting Right arm       Pain Score       07/04/24 0049       7           Vitals:    07/04/24 0009   BP: 125/74   Pulse: 81   Patient Position - Orthostatic VS: Sitting         Visual Acuity      ED Medications  Medications   sodium chloride 0.9 % bolus 1,000 mL (1,000 mL Intravenous New Bag 7/4/24 0051)   ketorolac (TORADOL) injection 15 mg (15 mg Intravenous Given 7/4/24 0049)       Diagnostic Studies  Results Reviewed       Procedure Component Value Units Date/Time    UA w Reflex to Microscopic w Reflex to Culture [[id number]] Collected: 07/04/24 0051    Lab Status: Final  result Specimen: Urine, Clean Catch Updated: 07/04/24 0100     Color, UA Colorless     Clarity, UA Clear     Specific Gravity, UA 1.010     pH, UA 7.0     Leukocytes, UA Negative     Nitrite, UA Negative     Protein, UA Negative mg/dl      Glucose, UA Negative mg/dl      Ketones, UA Negative mg/dl      Urobilinogen, UA <2.0 mg/dl      Bilirubin, UA Negative     Occult Blood, UA Negative    CBC and differential [[id number]] Collected: 07/04/24 0051    Lab Status: Final result Specimen: Blood from Arm, Right Updated: 07/04/24 0058     WBC 6.63 Thousand/uL      RBC 4.06 Million/uL      Hemoglobin 12.6 g/dL      Hematocrit 37.3 %      MCV 92 fL      MCH 31.0 pg      MCHC 33.8 g/dL      RDW 11.7 %      MPV 9.7 fL      Platelets 286 Thousands/uL      nRBC 0 /100 WBCs      Segmented % 64 %      Immature Grans % 0 %      Lymphocytes % 28 %      Monocytes % 6 %      Eosinophils Relative 1 %      Basophils Relative 1 %      Absolute Neutrophils 4.28 Thousands/µL      Absolute Immature Grans 0.01 Thousand/uL      Absolute Lymphocytes 1.82 Thousands/µL      Absolute Monocytes 0.40 Thousand/µL      Eosinophils Absolute 0.08 Thousand/µL      Basophils Absolute 0.04 Thousands/µL     Comprehensive metabolic panel [[id number]] Collected: 07/04/24 0051    Lab Status: In process Specimen: Blood from Arm, Right Updated: 07/04/24 0056    Lipase [[id number]] Collected: 07/04/24 0051    Lab Status: In process Specimen: Blood from Arm, Right Updated: 07/04/24 0056    POCT pregnancy, urine [[id number]]  (Normal) Resulted: 07/04/24 0045    Lab Status: Final result Updated: 07/04/24 0055     EXT Preg Test, Ur Negative     Control Valid                   CT abdomen pelvis w contrast    (Results Pending)              Procedures  Procedures         ED Course  ED Course as of 07/04/24 0111   u Jul 04, 2024   0108 Pt signed out to Dr Zamudio. Pending CT scan.                                              Medical Decision Making  Patient is a 19 y.o.  "female who presents to the ED for LUQ abd. Abdominal exam without peritoneal signs. No evidence of acute abdomen at this time. Well appearing.    Differential diagnosis includes: Splenic injury, gastritis, gastroenteritis.  Low suspicion for acute hepatobiliary disease (includng acute cholecystitis), acute pancreatitis, PUD (including perforation), acute infectious processes (pneumonia, hepatitis, pyelonephritis), acute appendicitis, vascular catastrophe, bowel obstruction or viscus perforation. Presentation not consistent with other acute, emergent causes of abdominal pain at this time.    Plan: labs, UA, CT AP, pain control, serial reassessment                 Portions of the record may have been created with voice recognition software. Occasional wrong word or \"sound a like\" substitutions may have occurred due to the inherent limitations of voice recognition software. Read the chart carefully and recognize, using context, where substitutions have occurred.    Amount and/or Complexity of Data Reviewed  Labs: ordered.  Radiology: ordered.    Risk  Prescription drug management.             Disposition  Final diagnoses:   LUQ abdominal pain     Time reflects when diagnosis was documented in both MDM as applicable and the Disposition within this note       Time User Action Codes Description Comment    7/4/2024  1:10 AM Sumeet Nevarez Add [R10.12] LUQ abdominal pain           ED Disposition       None          Follow-up Information    None         Patient's Medications   Discharge Prescriptions    No medications on file       No discharge procedures on file.    PDMP Review       None            ED Provider  Electronically Signed by             Sumeet Nevarez DO  07/04/24 0111    "

## 2024-07-04 NOTE — DISCHARGE INSTRUCTIONS
Your labs and CT were otherwise unremarkable.  You were found to have uterine fibroid which you can discuss with your OB doctor for further outpatient testing such as a pelvic ultrasound.  Please call to make an appointment.    You may have an element of gastritis which is causing her left upper quadrant abdominal pain.  You will start Pepcid 1 tablet twice a day for the next 2 weeks.    Return to ER if you develop any fevers, chills, nausea or vomiting.

## 2024-10-17 ENCOUNTER — OFFICE VISIT (OUTPATIENT)
Dept: URGENT CARE | Facility: CLINIC | Age: 19
End: 2024-10-17
Payer: COMMERCIAL

## 2024-10-17 VITALS
WEIGHT: 86.6 LBS | RESPIRATION RATE: 20 BRPM | TEMPERATURE: 97.5 F | SYSTOLIC BLOOD PRESSURE: 106 MMHG | HEART RATE: 103 BPM | OXYGEN SATURATION: 99 % | BODY MASS INDEX: 16.35 KG/M2 | HEIGHT: 61 IN | DIASTOLIC BLOOD PRESSURE: 64 MMHG

## 2024-10-17 DIAGNOSIS — R05.1 ACUTE COUGH: Primary | ICD-10-CM

## 2024-10-17 PROCEDURE — 99213 OFFICE O/P EST LOW 20 MIN: CPT | Performed by: PHYSICIAN ASSISTANT

## 2024-10-17 RX ORDER — AZITHROMYCIN 250 MG/1
TABLET, FILM COATED ORAL
Qty: 6 TABLET | Refills: 0 | Status: SHIPPED | OUTPATIENT
Start: 2024-10-17 | End: 2024-10-21

## 2024-10-17 RX ORDER — BENZONATATE 100 MG/1
100 CAPSULE ORAL 3 TIMES DAILY PRN
Qty: 20 CAPSULE | Refills: 0 | Status: SHIPPED | OUTPATIENT
Start: 2024-10-17

## 2024-10-17 NOTE — PROGRESS NOTES
[facility]        NAME: Julianna Carlos is a 19 y.o. female  : 2005    MRN: [id number]  DATE: 2024  TIME: 10:11 AM    Assessment and Plan   Acute cough [R05.1]  1. Acute cough  azithromycin (ZITHROMAX) 250 mg tablet    benzonatate (TESSALON PERLES) 100 mg capsule            Patient Instructions   -Zithromax was prescribed to be taken with food and a probiotic.   -Stay very well hydrated and push fluids, gatorade, pedialyte or electrolyte drinks can be beneficial.   -Run a humidifier by your bed and take steam showers to clear mucus   -Zicam nasal AllClear can be soothing to the nasal passages   -You can use flonase once daily for two weeks   -Advil or Tylenol for fever or pain.  -Mucinex OTC or Zyrtec or Claritin. Drink plenty of water with the mucinex.   -Honey or throat lozenges for cough may be helpful  -Warm salt water gargles and tea with honey   -Sleep with a few pillows propping you up at night to aid with coughing   -Obtain a pulse ox device and check your O2 1-2 times a day. You want your oxygen levels to be >93%. If they go below 93% go to the ED immediately.   -Vitamin C 500mg, Vitamin D 2000IU daily. You can attempt to use zinc or Zicam up to 30mg per day.   -If your sx worsen or persist follow up with your PCP for recheck. Red flag signs and ED precautions discussed.         Follow up with PCP in 3-5 days.  Proceed to  ER if symptoms worsen.    If tests have been performed at [facility], our office will contact you with results if changes need to be made to the care plan discussed with you at the visit.  You can review your full results on [facility].    Chief Complaint     Chief Complaint   Patient presents with    Cold Like Symptoms     Pt here ill x6 days pt states  s/s  fever at start,  now cough, and vomiting from the cough,  some ear pain,  nose bleeding which pt gets often.  Pt used OTC cough med.         History of Present Illness       The patient is a  "19-year-old female who presents today for a six day hx of fever, chills and coughing. The patient states that she now has post tussive vomiting and mild bilateral ear fullness. She states that she also has brief episodes of epistaxis which is \"normal for her\". She states that she had tactile fever at the onset of her sx.    No fever, chills, body aches today.  No dyspnea, wheezing, chest tightness, cp, palpitations. No dizziness or weakness. No diarrhea, constipation, abdominal pain.   No stridor or drooling. No rash. No sweats or diaphoresis.  Good PO intake. No loss of taste or smell. No lower extremity edema. No hx of asthma or smoking. No known sick contacts or recent travel. She has been doing robitussin cough and cold medications.             Review of Systems   Review of Systems   Constitutional:  Positive for appetite change and fatigue. Negative for activity change, chills, diaphoresis and fever.   HENT:  Positive for congestion. Negative for ear discharge, ear pain, facial swelling, hearing loss, postnasal drip, rhinorrhea, sinus pressure, sinus pain, sore throat, tinnitus, trouble swallowing and voice change.    Eyes:  Negative for visual disturbance.   Respiratory:  Positive for cough. Negative for apnea, chest tightness, shortness of breath, wheezing and stridor.    Cardiovascular:  Negative for chest pain, palpitations and leg swelling.   Gastrointestinal:  Positive for nausea and vomiting. Negative for abdominal distention, abdominal pain, constipation, diarrhea and rectal pain.   Genitourinary:  Negative for decreased urine volume.   Musculoskeletal:  Negative for arthralgias, joint swelling, myalgias, neck pain and neck stiffness.   Skin:  Negative for rash.   Allergic/Immunologic: Negative for immunocompromised state.   Neurological:  Negative for dizziness, weakness, light-headedness, numbness and headaches.   Hematological:  Negative for adenopathy.         Current Medications       Current " "Outpatient Medications:     azithromycin (ZITHROMAX) 250 mg tablet, Take 2 tablets today then 1 tablet daily x 4 days, Disp: 6 tablet, Rfl: 0    benzonatate (TESSALON PERLES) 100 mg capsule, Take 1 capsule (100 mg total) by mouth 3 (three) times a day as needed for cough, Disp: 20 capsule, Rfl: 0    Current Allergies     Allergies as of 10/17/2024 - Reviewed 10/17/2024   Allergen Reaction Noted    Keflex [cephalexin] GI Intolerance 01/25/2023            The following portions of the patient's history were reviewed and updated as appropriate: allergies, current medications, past family history, past medical history, past social history, past surgical history and problem list.     Past Medical History:   Diagnosis Date    Clostridium difficile infection     x 2 in 2022    Patient denies medical problems        Past Surgical History:   Procedure Laterality Date    NO PAST SURGERIES         Family History   Problem Relation Age of Onset    No Known Problems Mother     No Known Problems Father          Medications have been verified.        Objective   /64   Pulse 103   Temp 97.5 °F (36.4 °C)   Resp 20   Ht 5' 1\" (1.549 m)   Wt 39.3 kg (86 lb 9.6 oz)   SpO2 99%   BMI 16.36 kg/m²   No LMP recorded.       Physical Exam     Physical Exam  Vitals and nursing note reviewed.   Constitutional:       General: She is not in acute distress.     Appearance: She is well-developed. She is not ill-appearing, toxic-appearing or diaphoretic.   HENT:      Head: Normocephalic and atraumatic.      Right Ear: Hearing, tympanic membrane, ear canal and external ear normal.      Left Ear: Hearing, tympanic membrane, ear canal and external ear normal.      Nose: Nose normal. No mucosal edema or rhinorrhea.      Right Sinus: No maxillary sinus tenderness or frontal sinus tenderness.      Left Sinus: No maxillary sinus tenderness or frontal sinus tenderness.      Mouth/Throat:      Pharynx: Uvula midline. No oropharyngeal exudate, " posterior oropharyngeal erythema or uvula swelling.      Tonsils: No tonsillar abscesses.   Cardiovascular:      Rate and Rhythm: Normal rate and regular rhythm.      Heart sounds: S1 normal and S2 normal. Heart sounds not distant. No murmur heard.     No friction rub. No gallop. No S3 or S4 sounds.   Pulmonary:      Effort: No tachypnea, bradypnea, accessory muscle usage or respiratory distress.      Breath sounds: Transmitted upper airway sounds present. No stridor or decreased air movement. No decreased breath sounds, wheezing, rhonchi or rales.   Musculoskeletal:      Cervical back: Normal range of motion and neck supple.   Lymphadenopathy:      Cervical: No cervical adenopathy.   Neurological:      Mental Status: She is alert and oriented to person, place, and time.   Psychiatric:         Behavior: Behavior normal.

## 2024-10-17 NOTE — PATIENT INSTRUCTIONS
-Zithromax was prescribed to be taken with food and a probiotic.   -Stay very well hydrated and push fluids, gatorade, pedialyte or electrolyte drinks can be beneficial.   -Run a humidifier by your bed and take steam showers to clear mucus   -Zicam nasal AllClear can be soothing to the nasal passages   -You can use flonase once daily for two weeks   -Advil or Tylenol for fever or pain.  -Mucinex OTC or Zyrtec or Claritin. Drink plenty of water with the mucinex.   -Honey or throat lozenges for cough may be helpful  -Warm salt water gargles and tea with honey   -Sleep with a few pillows propping you up at night to aid with coughing   -Obtain a pulse ox device and check your O2 1-2 times a day. You want your oxygen levels to be >93%. If they go below 93% go to the ED immediately.   -Vitamin C 500mg, Vitamin D 2000IU daily. You can attempt to use zinc or Zicam up to 30mg per day.   -If your sx worsen or persist follow up with your PCP for recheck. Red flag signs and ED precautions discussed.

## 2024-10-17 NOTE — LETTER
October 17, 2024     Patient: Julianna Carlos   YOB: 2005   Date of Visit: 10/17/2024       To Whom It May Concern:    It is my medical opinion that Julianna Carlos needs to be excused from work on 10/18/24.     If you have any questions or concerns, please don't hesitate to call.         Sincerely,        Ambika Hope PA-C    CC: No Recipients